# Patient Record
Sex: MALE | Race: WHITE | URBAN - METROPOLITAN AREA
[De-identification: names, ages, dates, MRNs, and addresses within clinical notes are randomized per-mention and may not be internally consistent; named-entity substitution may affect disease eponyms.]

---

## 2017-07-26 ENCOUNTER — EMERGENCY (EMERGENCY)
Facility: HOSPITAL | Age: 67
LOS: 1 days | Discharge: HOME | End: 2017-07-26
Attending: EMERGENCY MEDICINE | Admitting: EMERGENCY MEDICINE
Payer: COMMERCIAL

## 2017-07-26 VITALS
WEIGHT: 225.75 LBS | HEART RATE: 90 BPM | OXYGEN SATURATION: 97 % | TEMPERATURE: 98 F | SYSTOLIC BLOOD PRESSURE: 183 MMHG | DIASTOLIC BLOOD PRESSURE: 124 MMHG | RESPIRATION RATE: 16 BRPM

## 2017-07-26 VITALS
SYSTOLIC BLOOD PRESSURE: 161 MMHG | RESPIRATION RATE: 18 BRPM | HEART RATE: 81 BPM | DIASTOLIC BLOOD PRESSURE: 84 MMHG | OXYGEN SATURATION: 98 %

## 2017-07-26 DIAGNOSIS — R41.82 ALTERED MENTAL STATUS, UNSPECIFIED: ICD-10-CM

## 2017-07-26 DIAGNOSIS — Z09 ENCOUNTER FOR FOLLOW-UP EXAMINATION AFTER COMPLETED TREATMENT FOR CONDITIONS OTHER THAN MALIGNANT NEOPLASM: Chronic | ICD-10-CM

## 2017-07-26 DIAGNOSIS — Z79.82 LONG TERM (CURRENT) USE OF ASPIRIN: ICD-10-CM

## 2017-07-26 LAB
ALBUMIN SERPL ELPH-MCNC: 4.5 G/DL — SIGNIFICANT CHANGE UP (ref 3.3–5)
ALP SERPL-CCNC: 71 U/L — SIGNIFICANT CHANGE UP (ref 40–120)
ALT FLD-CCNC: 19 U/L — SIGNIFICANT CHANGE UP (ref 10–45)
ANION GAP SERPL CALC-SCNC: 18 MMOL/L — HIGH (ref 5–17)
APPEARANCE UR: CLEAR — SIGNIFICANT CHANGE UP
APTT BLD: 27.1 SEC — LOW (ref 27.5–37.4)
AST SERPL-CCNC: 22 U/L — SIGNIFICANT CHANGE UP (ref 10–40)
BASOPHILS NFR BLD AUTO: 0.3 % — SIGNIFICANT CHANGE UP (ref 0–2)
BILIRUB SERPL-MCNC: 0.6 MG/DL — SIGNIFICANT CHANGE UP (ref 0.2–1.2)
BILIRUB UR-MCNC: NEGATIVE — SIGNIFICANT CHANGE UP
BLD GP AB SCN SERPL QL: NEGATIVE — SIGNIFICANT CHANGE UP
BLD GP AB SCN SERPL QL: NEGATIVE — SIGNIFICANT CHANGE UP
BUN SERPL-MCNC: 12 MG/DL — SIGNIFICANT CHANGE UP (ref 7–23)
CALCIUM SERPL-MCNC: 9.7 MG/DL — SIGNIFICANT CHANGE UP (ref 8.4–10.5)
CHLORIDE SERPL-SCNC: 98 MMOL/L — SIGNIFICANT CHANGE UP (ref 96–108)
CO2 SERPL-SCNC: 23 MMOL/L — SIGNIFICANT CHANGE UP (ref 22–31)
COLOR SPEC: YELLOW — SIGNIFICANT CHANGE UP
CREAT SERPL-MCNC: 0.9 MG/DL — SIGNIFICANT CHANGE UP (ref 0.5–1.3)
DIFF PNL FLD: NEGATIVE — SIGNIFICANT CHANGE UP
EOSINOPHIL NFR BLD AUTO: 0.1 % — SIGNIFICANT CHANGE UP (ref 0–6)
GLUCOSE SERPL-MCNC: 100 MG/DL — HIGH (ref 70–99)
GLUCOSE UR QL: NEGATIVE — SIGNIFICANT CHANGE UP
HCT VFR BLD CALC: 38.3 % — LOW (ref 39–50)
HGB BLD-MCNC: 13.2 G/DL — SIGNIFICANT CHANGE UP (ref 13–17)
INR BLD: 0.96 — SIGNIFICANT CHANGE UP (ref 0.88–1.16)
KETONES UR-MCNC: 15 MG/DL
LEUKOCYTE ESTERASE UR-ACNC: NEGATIVE — SIGNIFICANT CHANGE UP
LYMPHOCYTES # BLD AUTO: 21 % — SIGNIFICANT CHANGE UP (ref 13–44)
MCHC RBC-ENTMCNC: 29.4 PG — SIGNIFICANT CHANGE UP (ref 27–34)
MCHC RBC-ENTMCNC: 34.5 G/DL — SIGNIFICANT CHANGE UP (ref 32–36)
MCV RBC AUTO: 85.3 FL — SIGNIFICANT CHANGE UP (ref 80–100)
MONOCYTES NFR BLD AUTO: 6.6 % — SIGNIFICANT CHANGE UP (ref 2–14)
NEUTROPHILS NFR BLD AUTO: 72 % — SIGNIFICANT CHANGE UP (ref 43–77)
NITRITE UR-MCNC: NEGATIVE — SIGNIFICANT CHANGE UP
PH UR: 5.5 — SIGNIFICANT CHANGE UP (ref 5–8)
PLATELET # BLD AUTO: 226 K/UL — SIGNIFICANT CHANGE UP (ref 150–400)
POTASSIUM SERPL-MCNC: 4 MMOL/L — SIGNIFICANT CHANGE UP (ref 3.5–5.3)
POTASSIUM SERPL-SCNC: 4 MMOL/L — SIGNIFICANT CHANGE UP (ref 3.5–5.3)
PROT SERPL-MCNC: 7.8 G/DL — SIGNIFICANT CHANGE UP (ref 6–8.3)
PROT UR-MCNC: NEGATIVE MG/DL — SIGNIFICANT CHANGE UP
PROTHROM AB SERPL-ACNC: 10.7 SEC — SIGNIFICANT CHANGE UP (ref 9.8–12.7)
RBC # BLD: 4.49 M/UL — SIGNIFICANT CHANGE UP (ref 4.2–5.8)
RBC # FLD: 13.1 % — SIGNIFICANT CHANGE UP (ref 10.3–16.9)
RH IG SCN BLD-IMP: NEGATIVE — SIGNIFICANT CHANGE UP
RH IG SCN BLD-IMP: NEGATIVE — SIGNIFICANT CHANGE UP
SODIUM SERPL-SCNC: 139 MMOL/L — SIGNIFICANT CHANGE UP (ref 135–145)
SP GR SPEC: 1.01 — SIGNIFICANT CHANGE UP (ref 1–1.03)
UROBILINOGEN FLD QL: 0.2 E.U./DL — SIGNIFICANT CHANGE UP
WBC # BLD: 7.4 K/UL — SIGNIFICANT CHANGE UP (ref 3.8–10.5)
WBC # FLD AUTO: 7.4 K/UL — SIGNIFICANT CHANGE UP (ref 3.8–10.5)

## 2017-07-26 PROCEDURE — 82553 CREATINE MB FRACTION: CPT

## 2017-07-26 PROCEDURE — 93005 ELECTROCARDIOGRAM TRACING: CPT

## 2017-07-26 PROCEDURE — 93010 ELECTROCARDIOGRAM REPORT: CPT | Mod: NC

## 2017-07-26 PROCEDURE — 85025 COMPLETE CBC W/AUTO DIFF WBC: CPT

## 2017-07-26 PROCEDURE — 82550 ASSAY OF CK (CPK): CPT

## 2017-07-26 PROCEDURE — 70498 CT ANGIOGRAPHY NECK: CPT | Mod: 26

## 2017-07-26 PROCEDURE — 36415 COLL VENOUS BLD VENIPUNCTURE: CPT

## 2017-07-26 PROCEDURE — 95819 EEG AWAKE AND ASLEEP: CPT

## 2017-07-26 PROCEDURE — 99284 EMERGENCY DEPT VISIT MOD MDM: CPT | Mod: 25

## 2017-07-26 PROCEDURE — 84484 ASSAY OF TROPONIN QUANT: CPT

## 2017-07-26 PROCEDURE — 87086 URINE CULTURE/COLONY COUNT: CPT

## 2017-07-26 PROCEDURE — 70450 CT HEAD/BRAIN W/O DYE: CPT | Mod: 26,59

## 2017-07-26 PROCEDURE — 86900 BLOOD TYPING SEROLOGIC ABO: CPT

## 2017-07-26 PROCEDURE — 95816 EEG AWAKE AND DROWSY: CPT | Mod: 26

## 2017-07-26 PROCEDURE — 85730 THROMBOPLASTIN TIME PARTIAL: CPT

## 2017-07-26 PROCEDURE — 81003 URINALYSIS AUTO W/O SCOPE: CPT

## 2017-07-26 PROCEDURE — 70496 CT ANGIOGRAPHY HEAD: CPT

## 2017-07-26 PROCEDURE — 86850 RBC ANTIBODY SCREEN: CPT

## 2017-07-26 PROCEDURE — 80053 COMPREHEN METABOLIC PANEL: CPT

## 2017-07-26 PROCEDURE — 99285 EMERGENCY DEPT VISIT HI MDM: CPT | Mod: 25

## 2017-07-26 PROCEDURE — 85610 PROTHROMBIN TIME: CPT

## 2017-07-26 PROCEDURE — 99285 EMERGENCY DEPT VISIT HI MDM: CPT

## 2017-07-26 PROCEDURE — 70496 CT ANGIOGRAPHY HEAD: CPT | Mod: 26

## 2017-07-26 PROCEDURE — 86901 BLOOD TYPING SEROLOGIC RH(D): CPT

## 2017-07-26 PROCEDURE — 70498 CT ANGIOGRAPHY NECK: CPT

## 2017-07-26 PROCEDURE — 80307 DRUG TEST PRSMV CHEM ANLYZR: CPT

## 2017-07-26 PROCEDURE — 70450 CT HEAD/BRAIN W/O DYE: CPT

## 2017-07-26 RX ORDER — ASPIRIN/CALCIUM CARB/MAGNESIUM 324 MG
0 TABLET ORAL
Qty: 0 | Refills: 0 | COMMUNITY

## 2017-07-26 NOTE — ED PROVIDER NOTE - CHPI ED SYMPTOMS NEG
no dizziness/no fever/no vomiting/no weakness/no loss of consciousness/no blurred vision/no change in level of consciousness/no numbness/no confusion/no nausea

## 2017-07-26 NOTE — ED PROVIDER NOTE - OBJECTIVE STATEMENT
68 yo hx of colorectal surgery 6 weeks ago brought in for acute disorientation noticed at 830am this morning. Coworker states that repeatedly saying "I'm disoriented", did not know why he's here in [place], coming from [place]. no other facial/ext weakness. 66 yo hx of colorectal surgery 6 weeks ago brought in for acute disorientation noticed at 830am this morning. Coworker states that repeatedly saying "I'm disoriented", did not know why he's here in [place], coming from [place]. no other facial/ext weakness. no cp, sob, fever, headache, decreased urine output.

## 2017-07-26 NOTE — CONSULT NOTE ADULT - ATTENDING COMMENTS
Patient seen and examined with NP and Resident.  Agree with above.  Patient had altered mental status during important meeting this morning. He couldn't retain information regarding month or location even though it was provided multiple times during initial presentation in ER.  He slowly improved.  Workup negative including CT Head and CTA Head and Neck.  Routine EEG normal.  Most problem variant of Transient Global Amnesia, not TIA/CVA.  He is neurologically cleared for discharge.  He is aware of stroke signs and symptoms and will call 911/return to ED if he has new weakness, numbness, speech or visual deficits.   No contraindication to his work

## 2017-07-26 NOTE — CONSULT NOTE ADULT - SUBJECTIVE AND OBJECTIVE BOX
Stroke Code Consult Note    Last known well time/Time of onset of symptoms: 17, 8:30 am     HPI: 67 year old male with PMH of HTN, HLD, recent colorectal surgery for colon cancer (6 weeks ago), brought in by a coworker to [facility] ED with acute onset disorientation. Pt woke up this morning at 7 am in usual state of health. Was at a work meeting when he had witnessed onset of disorientation at 8:30 am. Pt was noted to keep repeating, "I'm disoriented, I'm disoriented." Coworker then took pt in a taxi to the ED where pt was again repeating, "Thank you. Thank you." Coworker denies noticing slurred speech, facial asymmetry, unilateral weakness, or gait imbalance at the time of onset of symptoms. Did note that pt did not eat breakfast this morning and did have a few glasses of wine at dinner yesterday night. Upon arrival to the ED, pt was still noted to be disoriented, was unable to state what month it was and what state he is or why he is here. No slurred speech, difficulty naming, unilateral weakness or numbness. Pt denies dizziness, lightheadedness, headache, nausea, vomiting, abdominal pain, chest pain, palpitations, or shortness of breath. No recent illnesses, sick contacts, fever, chills, or unintentional weight loss.     V/S on arrival : /124, HR 90, T 98.2, RR 16, SpO2 97%. Pt reports he took his blood pressure medications today. CT head performed. CTA head/neck with IV contrast performed.     PAST MEDICAL & SURGICAL HISTORY:  HTN  HLD  Colon cancer S/P colorectal surgery 6 weeks ago     Home meds:   lisinopril 10 mg once daily  atorvastatin 20 mg once daily     FAMILY HISTORY:  No pertinent family history     Social History: Lives in [place]. Currently traveling to [facility] for a work meeting. Scheduled to fly back Friday. Independent of all ADLs. No need for assistive device to ambulate. Denies smoking or illicit drug use. Last ETOH use was yesterday night at dinner - had a few glasses of wine. Usually drinks socially, 1 drink a day.     Review of Systems:  Constitutional: No fever, weight loss or fatigue  Eyes: No eye pain, visual disturbances, or discharge  ENMT:  No difficulty hearing, tinnitus, vertigo; No sinus or throat pain  Neck: No pain or stiffness  Respiratory: No cough, wheezing, chills or hemoptysis  Cardiovascular: No chest pain, palpitations, shortness of breath, dizziness or leg swelling  Gastrointestinal: No abdominal pain. No nausea, vomiting or hematemesis; No diarrhea or constipation.  Genitourinary: No dysuria, frequency, hematuria or incontinence  Neurological: As per HPI  Skin: No itching, burning, rashes or lesions   Endocrine: No heat or cold intolerance; No hair loss  Musculoskeletal: No joint pain or swelling; No muscle, back or extremity pain  Psychiatric: No depression, anxiety, mood swings or difficulty sleeping  Heme/Lymph: No easy bruising or bleeding gums    MEDICATIONS  (STANDING):    Allergies: No Known Allergies    Vital Signs Last 24 Hrs  T(C): 36.8 (2017 12:51), Max: 36.8 (2017 12:19)  T(F): 98.2 (2017 12:51), Max: 98.2 (2017 12:19)  HR: 86 (2017 13:25) (84 - 100)  BP: 160/93 (2017 13:25) (160/93 - 199/123)  BP(mean): --  RR: 18 (2017 13:25) (16 - 18)  SpO2: 98% (2017 13:25) (97% - 99%)    Gen: Lying in bed, calm, cooperative, pleasant, in NAD   CV:  reg rate and rhythm, no murmurs  Neuro:  Mental status: Awake, alert and oriented to self, birthday, age, hospital, [facility]. Unable to recall the accurate month-requires repeated reminders.  Recent and remote memory intact-able to recall all events starting from waking up this morning.  Naming, repetition and comprehension intact.  Attention/concentration intact.  No dysarthria (nasal quality to voice - pt and coworker reports this is baseline). No aphasia.  Fund of knowledge appropriate.    Cranial nerves: pupils equally round and reactive to light, 3 mm, visual fields full, no ptosis, no nystagmus, extraocular muscles intact, V1 through V3 intact bilaterally and symmetric, face symmetric, hearing intact to finger rub, palate elevation symmetric, tongue was midline, sternocleidomastoid/shoulder shrug strength bilaterally 5/5.    Motor:  Normal bulk and tone, strength 5/5 in bilateral upper and lower extremities.   strength strong bilaterally.   Sensation: Intact to light touch, proprioception, vibration, temperature, pinprick.  No neglect.   Coordination: No dysmetria on finger-to-nose and heel-to-shin.  No clumsiness. Rapid alternating movements intact and symmetric.   Reflexes: 2+ in upper and lower extremities, absent Babinski bilaterally  Gait: Narrow and steady. No ataxia.  Romberg negative    NIHSS: 1 (for inability to name month)     Blood glucose: 90     Labs:                        13.2   7.4   )-----------( 226      ( 2017 13:01 )             38.3     PT/INR - ( 2017 13:01 )   PT: 10.7 sec;   INR: 0.96     PTT - ( 2017 13:01 )  PTT:27.1 sec    Urinalysis Basic - ( 2017 13:25 )    Color: Yellow / Appearance: Clear / S.010 / pH: x  Gluc: x / Ketone: 15 mg/dL  / Bili: NEGATIVE / Urobili: 0.2 E.U./dL   Blood: x / Protein: NEGATIVE mg/dL / Nitrite: NEGATIVE   Leuk Esterase: NEGATIVE / RBC: x / WBC x   Sq Epi: x / Non Sq Epi: x / Bacteria: x    Radiology and Additional Studies:     CT head:   1. No CT evidence of acute intracranial hemorrhage.  2. Chronic microangiopathic disease and age-appropriate volume loss.  3. 2 mm focus of increased attenuation anterior third ventricle near   foramen of Monro may represent choroid plexus calcification or tiny   colloid cyst.     CTA head:   IMPRESSION: Intracranial CTA examination of the brain shows no proximal   arterial stenosis or occlusion.     CTA neck:   IMPRESSION:   No carotid or vertebral artery steno-occlusive disease in the neck.    IV-tPA (Y/N):      No                             Bolus time:  Reason IV-tPA not given: Rapidly resolving symptoms, NIHSS low at 1, low suspicion for stroke/TIA     Assessment & Plan:  67 year old male with PMH of HTN, HLD, recent colorectal surgery (6 weeks ago) for colon cancer, presents with acute onset of witnessed disorientation, was unable to name the month, did not know where he was, and kept repeating phrases.     -low suspicion of stroke/TIA   -likely transient global amnesia vs hypertensive urgency  -symptoms rapidly resolving, back to baseline upon reassessment - AA&Ox3, follows all commands, no weakness, no numbness; no focal deficits appreciated   -CT head without acute pathology  -CTA head/neck without large vessel occlusion or stenosis   -would check routine EEG to r/o seizures  -if EEG unremarkable and neurological exam continues to be stable - then no further in-patient neurological work up warranted Stroke Code Consult Note    Last known well time/Time of onset of symptoms: 17, 8:30 am     HPI: 67 year old male with PMH of HTN, HLD, recent colorectal surgery for colon cancer (6 weeks ago), brought in by a coworker to [facility] ED with acute onset disorientation. Pt woke up this morning at 7 am in usual state of health. Was at a work meeting when he had witnessed onset of disorientation at 8:30 am. Pt was noted to keep repeating, "I'm disoriented, I'm disoriented." Coworker then took pt in a taxi to the ED where pt was again repeating, "Thank you. Thank you." Coworker denies noticing slurred speech, facial asymmetry, unilateral weakness, or gait imbalance at the time of onset of symptoms. Did note that pt did not eat breakfast this morning and did have a few glasses of wine at dinner yesterday night. Upon arrival to the ED, pt was still noted to be disoriented, was unable to state what month it was and what state he is or why he is here. No slurred speech, difficulty naming, unilateral weakness or numbness. Pt denies dizziness, lightheadedness, headache, nausea, vomiting, abdominal pain, chest pain, palpitations, or shortness of breath. No recent illnesses, sick contacts, fever, chills, or unintentional weight loss.     V/S on arrival : /124, HR 90, T 98.2, RR 16, SpO2 97%. Pt reports he took his blood pressure medications today. CT head performed. CTA head/neck with IV contrast performed.     PAST MEDICAL & SURGICAL HISTORY:  HTN  HLD  Colon cancer S/P colorectal surgery 6 weeks ago     Home meds:   lisinopril 10 mg once daily  atorvastatin 20 mg once daily     FAMILY HISTORY:  No pertinent family history     Social History: Lives in [place]. Currently traveling to [facility] for a work meeting. Scheduled to fly back Friday. Independent of all ADLs. No need for assistive device to ambulate. Denies smoking or illicit drug use. Last ETOH use was yesterday night at dinner - had a few glasses of wine. Usually drinks socially, 1 drink a day.     Review of Systems:  Constitutional: No fever, weight loss or fatigue  Eyes: No eye pain, visual disturbances, or discharge  ENMT:  No difficulty hearing, tinnitus, vertigo; No sinus or throat pain  Neck: No pain or stiffness  Respiratory: No cough, wheezing, chills or hemoptysis  Cardiovascular: No chest pain, palpitations, shortness of breath, dizziness or leg swelling  Gastrointestinal: No abdominal pain. No nausea, vomiting or hematemesis; No diarrhea or constipation.  Genitourinary: No dysuria, frequency, hematuria or incontinence  Neurological: As per HPI  Skin: No itching, burning, rashes or lesions   Endocrine: No heat or cold intolerance; No hair loss  Musculoskeletal: No joint pain or swelling; No muscle, back or extremity pain  Psychiatric: No depression, anxiety, mood swings or difficulty sleeping  Heme/Lymph: No easy bruising or bleeding gums    MEDICATIONS  (STANDING):    Allergies: No Known Allergies    Vital Signs Last 24 Hrs  T(C): 36.8 (2017 12:51), Max: 36.8 (2017 12:19)  T(F): 98.2 (2017 12:51), Max: 98.2 (2017 12:19)  HR: 86 (2017 13:25) (84 - 100)  BP: 160/93 (2017 13:25) (160/93 - 199/123)  BP(mean): --  RR: 18 (2017 13:25) (16 - 18)  SpO2: 98% (2017 13:25) (97% - 99%)    Gen: Lying in bed, calm, cooperative, pleasant, in NAD   CV:  reg rate and rhythm, no murmurs  Neuro:  Mental status: Awake, alert and oriented to self, birthday, age, hospital, [facility]. Unable to recall the accurate month-requires repeated reminders.  Recent and remote memory intact-able to recall all events starting from waking up this morning.  Naming, repetition and comprehension intact.  Attention/concentration intact.  No dysarthria (nasal quality to voice - pt and coworker reports this is baseline). No aphasia.  Fund of knowledge appropriate.    Cranial nerves: pupils equally round and reactive to light, 3 mm, visual fields full, no ptosis, no nystagmus, extraocular muscles intact, V1 through V3 intact bilaterally and symmetric, face symmetric, hearing intact to finger rub, palate elevation symmetric, tongue was midline, sternocleidomastoid/shoulder shrug strength bilaterally 5/5.    Motor:  Normal bulk and tone, strength 5/5 in bilateral upper and lower extremities.   strength strong bilaterally.   Sensation: Intact to light touch, proprioception, vibration, temperature, pinprick.  No neglect.   Coordination: No dysmetria on finger-to-nose and heel-to-shin.  No clumsiness. Rapid alternating movements intact and symmetric.   Reflexes: 2+ in upper and lower extremities, absent Babinski bilaterally  Gait: Narrow and steady. No ataxia.  Romberg negative    NIHSS: 1 (for inability to name month)     Blood glucose: 90     Labs:                        13.2   7.4   )-----------( 226      ( 2017 13:01 )             38.3     PT/INR - ( 2017 13:01 )   PT: 10.7 sec;   INR: 0.96     PTT - ( 2017 13:01 )  PTT:27.1 sec    Urinalysis Basic - ( 2017 13:25 )    Color: Yellow / Appearance: Clear / S.010 / pH: x  Gluc: x / Ketone: 15 mg/dL  / Bili: NEGATIVE / Urobili: 0.2 E.U./dL   Blood: x / Protein: NEGATIVE mg/dL / Nitrite: NEGATIVE   Leuk Esterase: NEGATIVE / RBC: x / WBC x   Sq Epi: x / Non Sq Epi: x / Bacteria: x    Radiology and Additional Studies:     CT head:   1. No CT evidence of acute intracranial hemorrhage.  2. Chronic microangiopathic disease and age-appropriate volume loss.  3. 2 mm focus of increased attenuation anterior third ventricle near   foramen of Monro may represent choroid plexus calcification or tiny   colloid cyst.     CTA head:   IMPRESSION: Intracranial CTA examination of the brain shows no proximal   arterial stenosis or occlusion.     CTA neck:   IMPRESSION:   No carotid or vertebral artery steno-occlusive disease in the neck.    IV-tPA (Y/N):      No                             Bolus time:  Reason IV-tPA not given: Rapidly resolving symptoms, NIHSS low at 1, low suspicion for stroke/TIA     Assessment & Plan:  67 year old male with PMH of HTN, HLD, recent colorectal surgery (6 weeks ago) for colon cancer, presents with acute onset of witnessed disorientation, was unable to name the month, did not know where he was, and kept repeating phrases. Symptoms rapidly resolving, not a tpa candidate.     -low suspicion of stroke/TIA   -likely transient global amnesia vs hypertensive urgency  -symptoms rapidly resolving, back to baseline upon reassessment - AA&Ox3, follows all commands, no weakness, no numbness; no focal deficits appreciated   -CT head without acute pathology  -CTA head/neck without large vessel occlusion or stenosis   -would check routine EEG to r/o seizures  -if EEG unremarkable and neurological exam continues to be stable - then no further in-patient neurological work up warranted

## 2017-07-26 NOTE — ED PROVIDER NOTE - PROGRESS NOTE DETAILS
Discussed with stroke team ?TGA,will obtain EEG, if normal will dc with pmd and neuro follow up. Pt oriented, feels comfortable for dc. Discussed with pt results of work up, strict return precautions, and need for follow up.  Pt expressed understanding and agrees with plan. Discussed with stroke team ?TGA,will obtain EEG, if normal will dc with pmd and neuro follow up. Pt oriented, BP improved, feels comfortable for dc. Discussed with pt results of work up, strict return precautions, and need for follow up.  Pt expressed understanding and agrees with plan.

## 2017-07-26 NOTE — ED PROVIDER NOTE - MEDICAL DECISION MAKING DETAILS
acute disorientation; ddx: CVA vs toxic metabolic encephalopathy vs hypertensive crisis. stroke code activated. acute disorientation; ddx: CVA vs toxic metabolic encephalopathy vs hypertensive crisis vs TGA vs . stroke code activated. Plan to obtain labs, imaging, reeval.

## 2017-07-26 NOTE — ED ADULT TRIAGE NOTE - CHIEF COMPLAINT QUOTE
AMS since this morning. As per patient's  "he's been disoriented since this morning and he was repeating himself multiple times." Patient reports colon surgery 4 weeks ago. Denies any headache. Patient awake and alert, oriented to person and place. Ambulating with steady gait. No slurring of speech or facial drooping noted. Patient denies any weakness to one side of body.

## 2017-07-26 NOTE — ED ADULT NURSE NOTE - CHPI ED SYMPTOMS NEG
no weakness/no blurred vision/no vomiting/no fever/no numbness/no loss of consciousness/no nausea/no dizziness

## 2017-07-26 NOTE — ED ADULT NURSE NOTE - OBJECTIVE STATEMENT
Pt w/ PMH of colorectal sx x4 weeks ago presents to ED today c/o AMS.  Pt recently arrived in [place] via air travel from [facility].  Pt takes daily 162mg of baby aspirin, but is w/o other anticoagulants.  Pt denies cardiac hx.  Pt's colleague is at bedside and provides supplemental hx.  While at a work meeting at 8:30am today, pt's coworker noticed his friend "was talking funny."  Pt became disoriented.  On arrival pt was oriented to self and location, but not to time or year.  Pt is w/o ataxia or facial droop.  Pt is PERRLA, EOMI and NCAT.  Pt denies illicit drug use or hx of food or drug allergies.  Pt upgraded to stroke code and protocol initiated.  Will continue to monitor. Pt w/ PMH of colorectal sx x4 weeks ago presents to ED today c/o AMS.  Pt recently arrived in [place] via air travel from [place].  Pt takes daily 162mg of baby aspirin, but is w/o other anticoagulants.  Pt denies cardiac hx.  Pt's colleague is at bedside and provides supplemental hx.  While at a work meeting at 8:30am today, pt's coworker noticed his friend "was talking funny."  Pt became disoriented.  On arrival pt was oriented to self and location, but not to time or year.  Pt is w/o ataxia or facial droop.  Pt is PERRLA, EOMI and NCAT.  Pt denies illicit drug use or hx of food or drug allergies.  Pt upgraded to stroke code and protocol initiated.  Will continue to monitor.

## 2017-07-27 LAB
CULTURE RESULTS: NO GROWTH — SIGNIFICANT CHANGE UP
SPECIMEN SOURCE: SIGNIFICANT CHANGE UP

## 2017-08-31 PROBLEM — Z00.00 ENCOUNTER FOR PREVENTIVE HEALTH EXAMINATION: Status: ACTIVE | Noted: 2017-08-31

## 2021-07-25 NOTE — ED ADULT TRIAGE NOTE - NS ED TRIAGE CLINICAL UPGRADE
show Deteriorating patient status - Patient was clinically upgraded due to deteriorating patient status.
